# Patient Record
Sex: FEMALE | Race: WHITE | NOT HISPANIC OR LATINO | Employment: FULL TIME | URBAN - METROPOLITAN AREA
[De-identification: names, ages, dates, MRNs, and addresses within clinical notes are randomized per-mention and may not be internally consistent; named-entity substitution may affect disease eponyms.]

---

## 2017-01-03 ENCOUNTER — TELEPHONE (OUTPATIENT)
Dept: FAMILY MEDICINE CLINIC | Facility: CLINIC | Age: 52
End: 2017-01-03

## 2017-01-04 ENCOUNTER — OFFICE VISIT (OUTPATIENT)
Dept: FAMILY MEDICINE CLINIC | Facility: CLINIC | Age: 52
End: 2017-01-04

## 2017-01-04 VITALS
HEART RATE: 91 BPM | HEIGHT: 65 IN | DIASTOLIC BLOOD PRESSURE: 64 MMHG | BODY MASS INDEX: 26.16 KG/M2 | WEIGHT: 157 LBS | SYSTOLIC BLOOD PRESSURE: 122 MMHG | OXYGEN SATURATION: 97 % | TEMPERATURE: 98.1 F

## 2017-01-04 DIAGNOSIS — M54.40 ACUTE BILATERAL LOW BACK PAIN WITH SCIATICA, SCIATICA LATERALITY UNSPECIFIED: Primary | ICD-10-CM

## 2017-01-04 DIAGNOSIS — M62.830 MUSCLE SPASM OF BACK: ICD-10-CM

## 2017-01-04 PROCEDURE — 99213 OFFICE O/P EST LOW 20 MIN: CPT | Performed by: NURSE PRACTITIONER

## 2017-01-04 RX ORDER — GABAPENTIN 300 MG/1
300 CAPSULE ORAL 3 TIMES DAILY
Qty: 90 CAPSULE | Refills: 1 | Status: SHIPPED | OUTPATIENT
Start: 2017-01-04

## 2017-01-04 RX ORDER — CYCLOBENZAPRINE HCL 10 MG
TABLET ORAL
Qty: 30 TABLET | Refills: 2 | Status: SHIPPED | OUTPATIENT
Start: 2017-01-04

## 2017-01-04 NOTE — PROGRESS NOTES
Subjective   Marley Shelley is a 51 y.o. female.  Here today for follow up on her low back pain. She does not feel it has improved much but does not hurt as bad when she is not standing or walking. She can sit a certain way and the pain will subside. She had the MRI done on Mon and is here for the results and recommendations for treatment. She also needs some paperwork filled out for FMLA and a release to return to work. The MRI does not show disc herniation but a small disc bulge.  She does have disc dissecation and some foraminal stenosis.     Back Pain   This is a recurrent problem. The problem occurs intermittently. The problem is unchanged. The pain is present in the lumbar spine and gluteal. Radiates to: left hip and buttock. The pain is at a severity of 4/10. The symptoms are aggravated by standing and twisting (walking). Associated symptoms include leg pain and tingling (in feet). She has tried muscle relaxant and NSAIDs for the symptoms.        The following portions of the patient's history were reviewed and updated as appropriate: allergies, current medications, past medical history, past social history and problem list.  .    Review of Systems   Constitutional: Negative.    Respiratory: Negative.    Cardiovascular: Negative.    Musculoskeletal: Positive for back pain.   Neurological: Positive for tingling (in feet).       Objective   Physical Exam   Constitutional: She appears well-developed and well-nourished.   Cardiovascular: Normal rate, regular rhythm and normal heart sounds.    Pulmonary/Chest: Effort normal and breath sounds normal.   Musculoskeletal:        Lumbar back: She exhibits tenderness, pain and spasm.   Nursing note and vitals reviewed.    Vitals:    01/04/17 1254   BP: 122/64   Pulse: 91   Temp: 98.1 °F (36.7 °C)   SpO2: 97%       Assessment/Plan   Marley was seen today for follow-up and back pain.    Diagnoses and all orders for this visit:    Acute bilateral low back pain with sciatica,  sciatica laterality unspecified  -     gabapentin (NEURONTIN) 300 MG capsule; Take 1 capsule by mouth 3 (Three) Times a Day.  -     Ambulatory Referral to Pain Management    Muscle spasm of back  -     cyclobenzaprine (FLEXERIL) 10 MG tablet; Take one tablet at bedtime for muscle spasms    Review of MRI report: No acute findings. Multilevel disc and facet degeneration with greatest at L4/L5 facet joint level producing mild to mod chronic appearing bilateral foraminal stenosis.     Recommend she see pain management for possible CARMEN to see if pain can be relieved.   Increase Gabepentin to 300 mg TID  Ok to return to work. Does not appear to be work related so she will return to her full duties. Will complete the paperwork for FMLA and return to duty.  Follow up after pain management appointment.

## 2017-01-04 NOTE — MR AVS SNAPSHOT
Marley Daphney   1/4/2017 1:00 PM   Office Visit    Provider:  ISSAC Frye   Department:  [facility]   Dept Phone:  [phone]                Your Full Care Plan              Today's Medication Changes          These changes are accurate as of: 1/4/17  2:12 PM.  If you have any questions, ask your nurse or doctor.               New Medication(s)Ordered:     cyclobenzaprine 10 MG tablet   Commonly known as:  FLEXERIL   Take one tablet at bedtime for muscle spasms   Started by:  ISSAC Frye         Medication(s)that have changed:     gabapentin 300 MG capsule   Commonly known as:  NEURONTIN   Take 1 capsule by mouth 3 (Three) Times a Day.   What changed:    - medication strength  - how much to take  - how to take this  - when to take this  - additional instructions   Changed by:  ISSAC Frye            Where to Get Your Medications      These medications were sent to [address] [address] [facility] [phone] [facility] [phone]   [address] [address] [address]     Phone:  [phone]     cyclobenzaprine 10 MG tablet    gabapentin 300 MG capsule                  Your Updated Medication List          This list is accurate as of: 1/4/17  2:12 PM.  Always use your most recent med list.                aspirin 81 MG tablet       atorvastatin 40 MG tablet   Commonly known as:  LIPITOR       cyclobenzaprine 10 MG tablet   Commonly known as:  FLEXERIL   Take one tablet at bedtime for muscle spasms       EQ ESTROBLEND MENOPAUSE PO       fish oil 1000 MG capsule capsule       FLUoxetine 20 MG capsule   Commonly known as:  PROzac       gabapentin 300 MG capsule   Commonly known as:  NEURONTIN   Take 1 capsule by mouth 3 (Three) Times a Day.       meloxicam 15 MG tablet   Commonly known as:  MOBIC       vitamin E 400 UNIT capsule               We Performed the Following     Ambulatory Referral to  "Pain Management       You Were Diagnosed With        Codes Comments    Acute bilateral low back pain with sciatica, sciatica laterality unspecified    -  Primary ICD-10-CM: M54.40  ICD-9-CM: 724.2, 724.3     Muscle spasm of back     ICD-10-CM: M62.830  ICD-9-CM: 724.8       Instructions     None    Patient Instructions History      MyChart Signup     Our records indicate that you have declined [facility] sign[facility]. If you would like to sign up for [employer], please email [email or web address] or call [phone] to obtain an activation code.             Other Info from Your Visit           Allergies     Penicillins        Reason for Visit     Follow-up     Back Pain           Vital Signs     Blood Pressure Pulse Temperature Height Weight Oxygen Saturation    122/64 91 98.1 °F (36.7 °C) (Oral) 65\" (165.1 cm) 157 lb (71.2 kg) 97%    Body Mass Index Smoking Status                26.13 kg/m2 Current Every Day Smoker          Problems and Diagnoses Noted     Acute bilateral low back pain with sciatica, sciatica laterality unspecified    -  Primary    Muscle spasm of back          "

## 2017-01-05 ENCOUNTER — TELEPHONE (OUTPATIENT)
Dept: FAMILY MEDICINE CLINIC | Facility: CLINIC | Age: 52
End: 2017-01-05

## 2017-01-05 NOTE — TELEPHONE ENCOUNTER
The only thing I can recommend is to give her an increase in the Gabapentin. She can increase to 600 mg TID. Let me know how this works and I will send in a new script. Thanks.

## 2017-01-06 ENCOUNTER — OFFICE VISIT (OUTPATIENT)
Dept: FAMILY MEDICINE CLINIC | Facility: CLINIC | Age: 52
End: 2017-01-06

## 2017-01-06 VITALS
HEART RATE: 84 BPM | HEIGHT: 65 IN | BODY MASS INDEX: 26.33 KG/M2 | SYSTOLIC BLOOD PRESSURE: 126 MMHG | DIASTOLIC BLOOD PRESSURE: 68 MMHG | OXYGEN SATURATION: 98 % | WEIGHT: 158 LBS

## 2017-01-06 DIAGNOSIS — M54.40 ACUTE LEFT-SIDED LOW BACK PAIN WITH SCIATICA, SCIATICA LATERALITY UNSPECIFIED: Primary | ICD-10-CM

## 2017-01-06 PROCEDURE — 99213 OFFICE O/P EST LOW 20 MIN: CPT | Performed by: NURSE PRACTITIONER

## 2017-01-06 PROCEDURE — 96372 THER/PROPH/DIAG INJ SC/IM: CPT | Performed by: NURSE PRACTITIONER

## 2017-01-06 RX ORDER — METHYLPREDNISOLONE ACETATE 80 MG/ML
80 INJECTION, SUSPENSION INTRA-ARTICULAR; INTRALESIONAL; INTRAMUSCULAR; SOFT TISSUE ONCE
Status: COMPLETED | OUTPATIENT
Start: 2017-01-06 | End: 2017-01-06

## 2017-01-06 RX ADMIN — METHYLPREDNISOLONE ACETATE 80 MG: 80 INJECTION, SUSPENSION INTRA-ARTICULAR; INTRALESIONAL; INTRAMUSCULAR; SOFT TISSUE at 09:33

## 2017-01-06 NOTE — PROGRESS NOTES
Subjective   Marley Shelley is a 51 y.o. female. Here today for follow up on the back pain. She was seen in the office on Wed and released to return to work yesterday. She went back to work yesterday and was in terrible pain all day. She was not really able to do her job and they have no sit down work she can do. She does not think she is going to be able to work until she gets the epidural shots. She was able to stand only about 45 minutes before she had to sit. Her employer then moved her to a different job where she had to walk but she had to keep sitting down to make it thru the day. The pain still terrible. It radiates down her left leg to the foot. She states at times she feels like the leg is numb or heavy feeling. She is taking the Flexeril and has increased the gabapentin to 600 mg tid as instructed but has not really helped her much.      Back Pain   This is a recurrent problem. The current episode started more than 1 month ago. The problem occurs constantly. The problem is unchanged. The pain is present in the gluteal and lumbar spine. The quality of the pain is described as burning, stabbing and aching. The pain is at a severity of 8/10. The symptoms are aggravated by standing and twisting. Associated symptoms include leg pain and numbness. She has tried ice, heat, home exercises, muscle relaxant and NSAIDs for the symptoms. The treatment provided mild relief.        The following portions of the patient's history were reviewed and updated as appropriate: allergies, current medications, past medical history, past social history and problem list.    Review of Systems   Constitutional: Positive for activity change.   Respiratory: Negative.    Cardiovascular: Negative.    Musculoskeletal: Positive for back pain (radiating down left buttock and leg to the foot) and gait problem.   Neurological: Positive for numbness.       Objective   Physical Exam   Constitutional: She appears well-developed and  well-nourished.   Cardiovascular: Normal rate, regular rhythm and normal heart sounds.    Pulmonary/Chest: Effort normal and breath sounds normal.   Musculoskeletal:        Lumbar back: She exhibits decreased range of motion, tenderness, bony tenderness, pain and spasm.   Nursing note and vitals reviewed.    Vitals:    01/06/17 0850   BP: 126/68   Pulse: 84   SpO2: 98%       Assessment/Plan   Marley was seen today for back pain.    Diagnoses and all orders for this visit:    Acute left-sided low back pain with sciatica, sciatica laterality unspecified  -     methylPREDNISolone acetate (DEPO-medrol) injection 80 mg; Inject 1 mL into the shoulder, thigh, or buttocks 1 (One) Time.    Note given for her to be off work until she is able to see pain management regarding further treatment. It is ok to return to work but she will need to have sit down duties and they are not available at her employment. Continue other medications. Use heat or ice which ever is most comfortable.   RTC after evaluation by pain management.  Will get paperwork sent from work for FMLA.

## 2017-01-06 NOTE — LETTER
January 6, 2017     Patient: Marley Shelley   YOB: 1965   Date of Visit: 1/6/2017       To Whom It May Concern:    It is my medical opinion that Marley Shelley should remain out of work until evalutated by pain management for further treatment. Minimum of 2 weeks.           Sincerely,        ISSAC Frye    CC: No Recipients

## 2017-01-06 NOTE — MR AVS SNAPSHOT
Marley Shelley   1/6/2017 9:00 AM   Office Visit    Provider:  ISSAC Frye   Department:  [facility]   Dept Phone:  [phone]                Your Full Care Plan              Your Updated Medication List          This list is accurate as of: 1/6/17  9:43 AM.  Always use your most recent med list.                aspirin 81 MG tablet       atorvastatin 40 MG tablet   Commonly known as:  LIPITOR       cyclobenzaprine 10 MG tablet   Commonly known as:  FLEXERIL   Take one tablet at bedtime for muscle spasms       EQ ESTROBLEND MENOPAUSE PO       fish oil 1000 MG capsule capsule       FLUoxetine 20 MG capsule   Commonly known as:  PROzac       gabapentin 300 MG capsule   Commonly known as:  NEURONTIN   Take 1 capsule by mouth 3 (Three) Times a Day.       meloxicam 15 MG tablet   Commonly known as:  MOBIC       vitamin E 400 UNIT capsule               You Were Diagnosed With        Codes Comments    Acute left-sided low back pain with sciatica, sciatica laterality unspecified    -  Primary ICD-10-CM: M54.40  ICD-9-CM: 724.2, 724.3       Medications to be Given to You by a Medical Professional     Due       Frequency    (none) methylPREDNISolone acetate (DEPO-medrol) injection 80 mg  Once      Instructions     None    Patient Instructions History      MyChart Signup     Our records indicate that you have declined [facility] signup. If you would like to sign up for [employer], please email [email or web address] or call [phone] to obtain an activation code.             Other Info from Your Visit           Your Appointments     Feb 20, 2017  2:00 PM EST   New Patient with Destiny Ocampo MD   [facility] (--)    [address], [address] [address] [phone]           Bring all previous medical records and films, along with current medications and insurance information.  "Arrive 30 minutes prior to your appointment. Bring insurance card, photo ID and copay.              Allergies     Penicillins        Reason for Visit     Back Pain follow up from back pain, patient states she can not stand or walk because of the pain and would like to get a note for work       Vital Signs     Blood Pressure Pulse Height Weight Oxygen Saturation Body Mass Index    126/68 84 65\" (165.1 cm) 158 lb (71.7 kg) 98% 26.29 kg/m2    Smoking Status                   Current Every Day Smoker           Problems and Diagnoses Noted     Acute left-sided low back pain with sciatica, sciatica laterality unspecified    -  Primary      Medications Administered     methylPREDNISolone acetate (DEPO-medrol) injection 80 mg                    "

## 2017-01-11 ENCOUNTER — TELEPHONE (OUTPATIENT)
Dept: FAMILY MEDICINE CLINIC | Facility: CLINIC | Age: 52
End: 2017-01-11

## 2017-01-11 NOTE — TELEPHONE ENCOUNTER
No. I told her I could not backdate it because the first time I saw her was 12/22. I cannot date it for before she was seen.

## 2017-01-13 ENCOUNTER — TELEPHONE (OUTPATIENT)
Dept: FAMILY MEDICINE CLINIC | Facility: CLINIC | Age: 52
End: 2017-01-13

## 2017-01-13 NOTE — TELEPHONE ENCOUNTER
Patient called and stated she was supposed to get a referral to pain management Dr Romero and she was scheduled to see a pain management specialist that is 2 hours drive from her home. She requesting to have the referral changed to Dr. Romero.

## 2017-01-13 NOTE — TELEPHONE ENCOUNTER
Thanks.  I put that in the notes when I made the referral because she has to drive from [facility].  Thanks.

## 2017-01-16 ENCOUNTER — TELEPHONE (OUTPATIENT)
Dept: FAMILY MEDICINE CLINIC | Facility: CLINIC | Age: 52
End: 2017-01-16

## 2017-01-16 NOTE — TELEPHONE ENCOUNTER
Spoke with patient and let her know Kiesha has already given her a letter to return to work after she is evaluated by pain management. I let patient know I would fax letter over to them.